# Patient Record
Sex: FEMALE | Race: WHITE
[De-identification: names, ages, dates, MRNs, and addresses within clinical notes are randomized per-mention and may not be internally consistent; named-entity substitution may affect disease eponyms.]

---

## 2018-11-07 ENCOUNTER — HOSPITAL ENCOUNTER (EMERGENCY)
Dept: HOSPITAL 14 - H.ER | Age: 33
LOS: 2 days | Discharge: ANOTHER HOSPITAL | End: 2018-11-09
Payer: MEDICARE

## 2018-11-07 DIAGNOSIS — F31.9: Primary | ICD-10-CM

## 2018-11-07 DIAGNOSIS — G40.909: ICD-10-CM

## 2018-11-07 DIAGNOSIS — Z98.2: ICD-10-CM

## 2018-11-07 DIAGNOSIS — F79: ICD-10-CM

## 2018-11-07 DIAGNOSIS — Z79.899: ICD-10-CM

## 2018-11-07 DIAGNOSIS — F29: ICD-10-CM

## 2018-11-07 DIAGNOSIS — S09.90XA: ICD-10-CM
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
## 2018-11-07 DIAGNOSIS — F84.0: ICD-10-CM

## 2018-11-07 LAB
ALBUMIN SERPL-MCNC: 4.7 G/DL (ref 3.5–5)
ALBUMIN/GLOB SERPL: 1.1 {RATIO} (ref 1–2.1)
ALT SERPL-CCNC: 44 U/L (ref 9–52)
AST SERPL-CCNC: 29 U/L (ref 14–36)
BASOPHILS # BLD AUTO: 0.2 K/UL (ref 0–0.2)
BASOPHILS NFR BLD: 1.3 % (ref 0–2)
BUN SERPL-MCNC: 11 MG/DL (ref 7–17)
CALCIUM SERPL-MCNC: 10 MG/DL (ref 8.4–10.2)
EOSINOPHIL # BLD AUTO: 0.1 K/UL (ref 0–0.7)
EOSINOPHIL NFR BLD: 0.7 % (ref 0–4)
ERYTHROCYTE [DISTWIDTH] IN BLOOD BY AUTOMATED COUNT: 14 % (ref 11.5–14.5)
GFR NON-AFRICAN AMERICAN: > 60
HGB BLD-MCNC: 15 G/DL (ref 12–16)
LYMPHOCYTES # BLD AUTO: 2.8 K/UL (ref 1–4.3)
LYMPHOCYTES NFR BLD AUTO: 19.5 % (ref 20–40)
MCH RBC QN AUTO: 28.4 PG (ref 27–31)
MCHC RBC AUTO-ENTMCNC: 32.7 G/DL (ref 33–37)
MCV RBC AUTO: 86.9 FL (ref 81–99)
MONOCYTES # BLD: 1.1 K/UL (ref 0–0.8)
MONOCYTES NFR BLD: 7.6 % (ref 0–10)
NEUTROPHILS # BLD: 10.3 K/UL (ref 1.8–7)
NEUTROPHILS NFR BLD AUTO: 70.9 % (ref 50–75)
NRBC BLD AUTO-RTO: 0 % (ref 0–0)
PLATELET # BLD: 389 K/UL (ref 130–400)
PMV BLD AUTO: 9.4 FL (ref 7.2–11.7)
RBC # BLD AUTO: 5.29 MIL/UL (ref 3.8–5.2)
WBC # BLD AUTO: 14.5 K/UL (ref 4.8–10.8)

## 2018-11-07 PROCEDURE — 70450 CT HEAD/BRAIN W/O DYE: CPT

## 2018-11-07 PROCEDURE — 81003 URINALYSIS AUTO W/O SCOPE: CPT

## 2018-11-07 PROCEDURE — 81025 URINE PREGNANCY TEST: CPT

## 2018-11-07 PROCEDURE — 80164 ASSAY DIPROPYLACETIC ACD TOT: CPT

## 2018-11-07 PROCEDURE — 80053 COMPREHEN METABOLIC PANEL: CPT

## 2018-11-07 PROCEDURE — 85025 COMPLETE CBC W/AUTO DIFF WBC: CPT

## 2018-11-07 PROCEDURE — 99285 EMERGENCY DEPT VISIT HI MDM: CPT

## 2018-11-07 PROCEDURE — 87086 URINE CULTURE/COLONY COUNT: CPT

## 2018-11-08 LAB
BACTERIA #/AREA URNS HPF: (no result) /[HPF]
BILIRUB UR-MCNC: NEGATIVE MG/DL
COLOR UR: YELLOW
GLUCOSE UR STRIP-MCNC: (no result) MG/DL
LEUKOCYTE ESTERASE UR-ACNC: (no result) LEU/UL
PH UR STRIP: 7 [PH] (ref 5–8)
PROT UR STRIP-MCNC: NEGATIVE MG/DL
RBC # UR STRIP: NEGATIVE /UL
SP GR UR STRIP: 1.01 (ref 1–1.03)
SQUAMOUS EPITHIAL: 4 /HPF (ref 0–5)
URINE CLARITY: (no result)
UROBILINOGEN UR-MCNC: (no result) MG/DL (ref 0.2–1)

## 2018-11-08 NOTE — ED PDOC
- Laboratory Results


Result Diagrams: 


                                 18 20:53





                                 18 20:53





- ECG


O2 Sat by Pulse Oximetry: 98 (RA)


Pulse Ox Interpretation: Normal





Medical Decision Making


Medical Decision Makin:00


--Patient endorsed to this provider by Dr. Son pending [facility] evaluation. She has already been medically cleared for psychiatric 

evaluation. 





09:37 


--Upon reevaluation, patient became agitated, began screaming and cursing at the

staff and tried to get out of bed. Ativan 2 mg IM was given to relieve her 

agitation. 


pt still agitated.





 1130 am pt seen by psychiatrist Dr Saldaña who will order more medication for 

patient. [place] [facility] [place] available yet.





130 pm pt calmer, resting in no distress





1500 pt transfer to Dr Collier [place] [facility]


-----------------------------------------------

--------------------------------------------------


Scribe Attestation:


Documented by Zarina Cardenas acting as a scribe for Yocasta Bello MD





Provider Scribe Attestation:


All medical record entries made by the Scribe were at my direction and 

personally dictated by me. I have reviewed the chart and agree that the record 

accurately reflects my personal performance of the history, physical exam, me

dical decision making, and the department course for this patient. I have also 

personally directed, reviewed, and agree with the discharge instructions and 

disposition.








Disposition





- Clinical Impression


Clinical Impression: 


 Bipolar disorder








- POA


Present On Arrival: None





- Disposition


Disposition: Transfer of Care


Disposition Time: 15:00


Condition: STABLE


Forms:  [facility] (English)


Patient Signed Over To: Kristin Collier


Handoff Comments: pending [facility] [place]

## 2018-11-08 NOTE — CP.PCM.CON
History of Present Illness





- History of Present Illness


History of Present Illness: 


Psychiatry consult note





CC: "I need to get my period back, my parents took it from me."





HPI: 32 yo female w/ h/o intellectual disability and bipolar disorder, presents 

acutely bizarre, labile, disorganized, w/ self-injurious behaviors and ideation.

 Patient historian and expresses paranoid ideation towards her parents.  No 

drugs/etoh.  Patient screened by [facility] and accepted for involuntary psychiatric 

admission.  Patient is currently compliant with her medications: Latuda, 

Depakote and Ativan





Impression: 32 yo female w/ intellectual disability and bipolar disorder, 

presents acutely decompensated, pending transfer to [facility] for involuntary 

psychiatric admission.





-Zyprexa 5 mg PO stat


-Depakote 500 mg PO stat 


-Transfer to [facility] when bed is available





Past Patient History





- Past Social History


Smoking Status: Never Smoked





- CARDIAC


Hx Hypertension: No





- PULMONARY


Hx Tuberculosis: No





- NEUROLOGICAL


Hx Seizures: Yes





- HEMATOLOGICAL/ONCOLOGICAL


Hx Human Immunodeficiency Virus (HIV): No





- GENITOURINARY/GYNECOLOGICAL


Hx Sexually Transmitted Disorders: No





- PSYCHIATRIC


Hx Bipolar Disorder: Yes


Hx Depression: Yes





- SURGICAL HISTORY


Hx Surgeries: No


Other/Comment: nystagmus correction surgery.  V-P shunt.





- ANESTHESIA


Hx Anesthesia: Yes


Hx Anesthesia Reactions: No


Hx Malignant Hyperthermia: No





Meds


Allergies/Adverse Reactions: 


                                    Allergies











Allergy/AdvReac Type Severity Reaction Status Date / Time


 


No Known Allergies Allergy   Verified 07/08/16 21:54














Results





- Vital Signs


Recent Vital Signs: 


                                Last Vital Signs











Temp  98.2 F   11/08/18 02:25


 


Pulse  93 H  11/08/18 02:25


 


Resp  18   11/08/18 02:25


 


BP  111/63   11/08/18 02:25


 


Pulse Ox  98   11/08/18 09:38














- Labs


Result Diagrams: 


                                 11/07/18 20:53





                                 11/07/18 20:53


Labs: 


                         Laboratory Results - last 24 hr











  11/07/18 11/07/18 11/07/18





  20:53 20:53 20:53


 


WBC  14.5 H  


 


RBC  5.29 H  


 


Hgb  15.0  


 


Hct  46.0  


 


MCV  86.9  


 


MCH  28.4  


 


MCHC  32.7 L  


 


RDW  14.0  


 


Plt Count  389  D  


 


MPV  9.4  


 


Neut % (Auto)  70.9  


 


Lymph % (Auto)  19.5 L  


 


Mono % (Auto)  7.6  


 


Eos % (Auto)  0.7  


 


Baso % (Auto)  1.3  


 


Neut # (Auto)  10.3 H  


 


Lymph # (Auto)  2.8  


 


Mono # (Auto)  1.1 H  


 


Eos # (Auto)  0.1  


 


Baso # (Auto)  0.2  


 


Sodium   142 


 


Potassium   4.8 


 


Chloride   105 


 


Carbon Dioxide   26 


 


Anion Gap   16 


 


BUN   11 


 


Creatinine   0.5 L 


 


Est GFR ( Amer)   > 60 


 


Est GFR (Non-Af Amer)   > 60 


 


Random Glucose   92 


 


Calcium   10.0 


 


Total Bilirubin   0.4 


 


AST   29 


 


ALT   44 


 


Alkaline Phosphatase   51 


 


Total Protein   8.8 H 


 


Albumin   4.7 


 


Globulin   4.1 H 


 


Albumin/Globulin Ratio   1.1 


 


Urine Color   


 


Urine Clarity   


 


Urine pH   


 


Ur Specific Gravity   


 


Urine Protein   


 


Urine Glucose (UA)   


 


Urine Ketones   


 


Urine Blood   


 


Urine Nitrate   


 


Urine Bilirubin   


 


Urine Urobilinogen   


 


Ur Leukocyte Esterase   


 


Urine RBC (Auto)   


 


Urine Microscopic WBC   


 


Ur Squamous Epith Cells   


 


Urine Bacteria   


 


Urine Opiates Screen   


 


Urine Methadone Screen   


 


Ur Barbiturates Screen   


 


Valproic Acid    50.3


 


Ur Phencyclidine Scrn   


 


Ur Amphetamines Screen   


 


U Benzodiazepines Scrn   


 


U Oth Cocaine Metabols   


 


U Cannabinoids Screen   


 


Alcohol, Quantitative   














  11/08/18 11/08/18 11/08/18





  03:37 03:37 04:15


 


WBC   


 


RBC   


 


Hgb   


 


Hct   


 


MCV   


 


MCH   


 


MCHC   


 


RDW   


 


Plt Count   


 


MPV   


 


Neut % (Auto)   


 


Lymph % (Auto)   


 


Mono % (Auto)   


 


Eos % (Auto)   


 


Baso % (Auto)   


 


Neut # (Auto)   


 


Lymph # (Auto)   


 


Mono # (Auto)   


 


Eos # (Auto)   


 


Baso # (Auto)   


 


Sodium   


 


Potassium   


 


Chloride   


 


Carbon Dioxide   


 


Anion Gap   


 


BUN   


 


Creatinine   


 


Est GFR ( Amer)   


 


Est GFR (Non-Af Amer)   


 


Random Glucose   


 


Calcium   


 


Total Bilirubin   


 


AST   


 


ALT   


 


Alkaline Phosphatase   


 


Total Protein   


 


Albumin   


 


Globulin   


 


Albumin/Globulin Ratio   


 


Urine Color  Yellow  


 


Urine Clarity  Slighty-cloudy  


 


Urine pH  7.0  


 


Ur Specific Gravity  1.009  


 


Urine Protein  Negative  


 


Urine Glucose (UA)  Neg  


 


Urine Ketones  Negative  


 


Urine Blood  Negative  


 


Urine Nitrate  Negative  


 


Urine Bilirubin  Negative  


 


Urine Urobilinogen  0.2-1.0  


 


Ur Leukocyte Esterase  Neg  


 


Urine RBC (Auto)  < 1  


 


Urine Microscopic WBC  2  


 


Ur Squamous Epith Cells  4  


 


Urine Bacteria  Few H  


 


Urine Opiates Screen   Negative 


 


Urine Methadone Screen   Negative 


 


Ur Barbiturates Screen   Negative 


 


Valproic Acid   


 


Ur Phencyclidine Scrn   Negative 


 


Ur Amphetamines Screen   Negative 


 


U Benzodiazepines Scrn   Negative 


 


U Oth Cocaine Metabols   Negative 


 


U Cannabinoids Screen   Negative 


 


Alcohol, Quantitative    < 10

## 2018-11-08 NOTE — ED PDOC
HPI: Psych/Substance Abuse


Time Seen by Provider: 18 19:11


Chief Complaint (Nursing): Psychiatric Evaluation


Additional Complaint(s): 





32 y/o female with PMH of bipolar disorder, depression, autism, epilepsy (w/  

shunt) who presents to the ED with father for psychiatric evaluation. Per f

ather, pt was agitated this afternoon, yelling and banging her head against a 

door, saying that she wanted to kill herself. Denies LOC. Pt also attempted to 

harm herself with kitchen utensils. Father said she has not had an outburst like

this in 2 years. Pt regularly follows up with [facility], Dr. Salinas. 

She takes Latuda, Depakote, and Ativan daily. Pt had one episode of emesis this 

afternoon after lunch. Denies substance use, HI, hallucinations, fevers, chills,

chest pain, SOB, cough, abdominal pain, nausea, diarrhea, constipation, urinary 

symptoms, back pain, neck pain, headache, vision changes.  





Past Medical History


Reviewed: Historical Data, Nursing Documentation, Vital Signs


Vital Signs: 





                                Last Vital Signs











Temp  99.4 F   18 18:45


 


Pulse  115 H  18 18:45


 


Resp  20   18 18:45


 


BP  131/83   18 18:45


 


Pulse Ox  98   18 18:45














- Medical History


PMH: Bipolar Disorder, Depression, Seizures


   Denies: Diabetes, Hepatitis, HIV, HTN, Sexually Transmitted Disease





- Family History


Family History: States: No Known Family Hx





- Home Medications


Home Medications: 


                                Ambulatory Orders











 Medication  Instructions  Recorded


 


Divalproex [Depakote DR] 1 tab PO DAILY 18


 


Divalproex [Depakote DR] 2 tab PO HS 18


 


LORazepam [Ativan] 1 mg PO BID 18


 


Lurasidone Hydrochloride [Latuda] 1 tab PO DAILY 18














- Allergies


Allergies/Adverse Reactions: 


                                    Allergies











Allergy/AdvReac Type Severity Reaction Status Date / Time


 


No Known Allergies Allergy   Verified 16 21:54














Review of Systems


ROS Statement: Except As Marked, All Systems Reviewed And Found Negative


Constitutional: Negative for: Fever, Chills


Eyes: Negative for: Vision Change


ENT: Negative for: Nose Congestion


Cardiovascular: Negative for: Chest Pain, Palpitations


Respiratory: Negative for: Cough, Shortness of Breath


Gastrointestinal: Positive for: Vomiting.  Negative for: Nausea, Abdominal Pain


Genitourinary Female: Negative for: Dysuria, Frequency, Incontinence, Vaginal 

Discharge, Vaginal Bleeding, Pelvic Pain


Musculoskeletal: Negative for: Neck Pain, Shoulder Pain, Arm Pain, Back Pain, 

Hand Pain, Leg Pain, Foot Pain


Skin: Negative for: Rash


Neurological: Positive for: Seizures, Altered Mental Status.  Negative for: 

Weakness, Numbness, Headache (agitation), Dizziness


Psych: Positive for: Anxiety, Depression, Suicidal ideation





Physical Exam





- Reviewed


Nursing Documentation Reviewed: Yes


Vital Signs Reviewed: Yes





- Physical Exam


Appears: Positive for: Well, Non-toxic, No Acute Distress


Head Exam: Positive for: ATRAUMATIC, NORMAL INSPECTION, NORMOCEPHALIC


Skin: Positive for: Normal Color, Warm, DRY


Eye Exam: Positive for: EOMI, Normal appearance, PERRL


ENT: Positive for: Normal ENT Inspection


Neck: Positive for: Normal, Painless ROM


Cardiovascular/Chest: Positive for: Regular Rate, Rhythm


Respiratory: Positive for: CNT, Normal Breath Sounds


Pulses-Radial (L): 2+


Pulses-Radial (R): 2+


Gastrointestinal/Abdominal: Positive for: Normal Exam, Soft


Back: Positive for: Normal Inspection


Extremity: Positive for: Normal ROM.  Negative for: Tenderness, Deformity, 

Swelling


Neurologic/Psych: Positive for: Alert, CNs II-XII, Oriented, Mood/Affect 

(anxious).  Negative for: Motor/Sensory Deficits





- Laboratory Results


Result Diagrams: 


                                 18 20:53





                                 18 20:53





- ECG


O2 Sat by Pulse Oximetry: 98





Medical Decision Making


Medical Decision Makin:15


Initial Plan:


* Head CT


* CXR, AXR


* Labs


* Valproic acid level


* UA, culture


* UDS


* 1:1


* Crisis Eval





21:00 


Crisis diagnosed pt with Bipolar Disorder, per Dr. Davis. Cannot admit here 

secondary to history of autism, will [place] [facility] 





CBC: leukocytosis, similar to prior visit in 2016. Pending imaging. 


CMP: wnl


Depakote level: wnl





21:30


Pt allowed to take her evening dose of Depakote, per Dr. Son. 





00:00


Will sign out pt to ANGELA Raman who will take over the care of pt. She was 

made aware of all resulted diagnostic studies. [place] [facility] [place], EKG, 

Imaging, urine. Pt stable, resting comfortably in stretcher. 





Disposition





- Clinical Impression


Clinical Impression: 


 Bipolar disorder








- Patient ED Disposition


Is Patient to be Admitted: Transfer of Care





- Disposition


Disposition: Transfer of Care


Disposition Time: 00:00


Condition: STABLE


Forms:  [employer] (English)


Patient Signed Over To: Oriana Vazquez (Pending [facility] [place], EKG, Imaging, 

Urine)

## 2018-11-08 NOTE — ED PDOC
- Laboratory Results


Result Diagrams: 


                                 18 20:53





                                 18 20:53





- ECG


ECG: Positive for: Viewed By Me (reviewed by ED attending)


ECG Rhythm: Positive for: Sinus Rhythm


O2 Sat by Pulse Oximetry: 98





- Progress


ED Course And Treament: 





Case endorsed to writer from James [facility] pending CT, xray, ekg, urine, [facility] [place]

reen





Patient given Zyprexa PO medication due to persistent agitation in ED





2:45


Patient still agitated; intermittently screaming and cursing.  Ativan IM ordered





4:30


Patient sleeping; no distress





CT SCAN OF THE BRAIN WITHOUT IV CONTRAST 


CLINICAL INDICATION: Injury. 


TECHNIQUE: Axial and reformatted sagittal and coronal images of the brain 

obtained without IV contrast administration. 


FINDINGS: 


Absent corpus callosum. 


Unremarkable right ventriculoperitoneal shunt. 


Normal size of the ventricles and extra-axial spaces for the patient's age. 


Normal white matter tracts of the supratentorial brain. 


Normal basal ganglia and thalami. 


Normal brainstem. 


Normal cerebellum. 


There is no demonstrated extra-axial, intraparenchymal, or intraventricular 

hemorrhage. 


There are no findings of an acute ischemic infarction. 


Normal calvarium. There is no demonstrated fracture. 


Normal soft tissue structures. 


Normal visualized paranasal sinuses. 


IMPRESSION: 


Absent corpus callosum. 


Unremarkable right ventriculoperitoneal shunt. 


No CT evidence of acute brain pathology.














<Oriana Vazquez - Last Filed: 18 04:53>





- Laboratory Results


Result Diagrams: 


                                 18 20:53





                                 18 20:53





<Matheus Son - Last Filed: 18 06:47>





Medical Decision Making


Medical Decision Makin


Patient endorsed to Dr. Bello pending [facility] [place].  CXR is wnl.  Patient 

medically cleared.





<Matheus Son - Last Filed: 18 06:47>





Disposition





- POA


Present On Arrival: None





- Disposition


Disposition: Transfer of Care


Disposition Time: 05:00


Patient Signed Over To: Matheus Son


Handoff Comments: pending xray, [facility] [place]





<Oriana Vazquez - Last Filed: 18 04:53>





- POA


Present On Arrival: None





- Disposition


Disposition: Transfer of Care


Disposition Time: 07:00


Patient Signed Over To: Eugenia,Haviva Y


Handoff Comments: pending [facility]





<Matheus Son - Last Filed: 18 06:47>





- Clinical Impression


Clinical Impression: 


 Bipolar disorder








- Disposition


Forms:  [facility] (English)

## 2018-11-08 NOTE — RAD
Date of service: 



11/08/2018



HISTORY:

 clearance 



COMPARISON:

7/9/2016 



FINDINGS:



LUNGS:

No active pulmonary disease.



PLEURA:

No significant pleural effusion identified, no pneumothorax apparent.



CARDIOVASCULAR:

No aortic atherosclerotic calcification present.



Normal cardiac size. No pulmonary vascular congestion.  Right 

ventriculoperitoneal shunt catheter traverses the right hemithorax.



OSSEOUS STRUCTURES:

No significant abnormalities.



VISUALIZED UPPER ABDOMEN:

Normal.



OTHER FINDINGS:

None.



IMPRESSION:

No active disease.

## 2018-11-08 NOTE — CARD
--------------- APPROVED REPORT --------------





Date of service: 11/08/2018



EKG Measurement

Heart [id number]

IN [id number]

[id number]

[id number]

[id number]



<Conclusion>

Normal sinus rhythm

Normal ECG

## 2018-11-08 NOTE — ED PDOC
- Laboratory Results


Result Diagrams: 


                                 11/07/18 20:53





                                 11/07/18 20:53





- ECG


O2 Sat by Pulse Oximetry: 99 (RA)


Pulse Ox Interpretation: Normal





Medical Decision Making


Medical Decision Making: 


15:00 


--Patient is medically and psychiatric cleared for admission to [facility]. Pending 

bed availability. 














------------------------------------------

-------------------------------------------------------


Scribe Attestation:


Documented by Zarina Cardenas acting as a scribe for Kristin Collier MD





Provider Scribe Attestation:


All medical record entries made by the Scribe were at my direction and 

personally dictated by me. I have reviewed the chart and agree that the record 

accurately reflects my personal performance of the history, physical exam, 

medical decision making, and the department course for this patient. I have also

personally directed, reviewed, and agree with the discharge instructions and 

disposition.





1655


--Pt now becoming more agitated, shouting, and pacing around the room.  Will 

given 2mg Ativan. 





Disposition





- Clinical Impression


Clinical Impression: 


 Bipolar disorder








- Disposition


Condition: STABLE


Forms:  [employer] (English)

## 2018-11-08 NOTE — CT
Date of service: 



11/08/2018



PROCEDURE:  CT HEAD WITHOUT CONTRAST.



HISTORY:

head injury



COMPARISON:

None available.



TECHNIQUE:

Axial computed tomography images were obtained through the head/brain 

without intravenous contrast.  



Radiation dose:



Total exam DLP = 876.83 mGy-cm.



This CT exam was performed using one or more of the following dose 

reduction techniques: Automated exposure control, adjustment of the 

mA and/or kV according to patient size, and/or use of iterative 

reconstruction technique.



FINDINGS:



HEMORRHAGE:

No intracranial hemorrhage. 



BRAIN:

Agenesis of the corpus callosum.  No intracranial mass, hemorrhage or 

evidence of acute infarct. 



VENTRICLES:

Two right parietal ventriculostomy catheters.  No ventriculomegaly.  

Focal dilatation of the atrium/occipital horn of the left lateral 

ventricle is noted, of no probable clinical significance.  This 

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 

malformation and absent corpus callosum.  No fracture.  Two right 

parietal ventriculostomy catheters. 



CALVARIUM:

Unremarkable.



PARANASAL SINUSES:

Unremarkable as visualized. No significant inflammatory changes.



MASTOID AIR CELLS:

Unremarkable as visualized. No inflammatory changes.



OTHER FINDINGS:

None.



IMPRESSION:

Absent corpus callosum.  Focal dilatation of the atrium/occipital 

horn left lateral ventricle.  Otherwise unremarkable.  No 

intracranial hemorrhage.



The preliminary findings for this examination were reported by [facility] 

Radiology at 11/8/2018 at 4:52 a.m..  There is concurrence of this 

report with the preliminary findings.

## 2018-11-09 VITALS
SYSTOLIC BLOOD PRESSURE: 103 MMHG | DIASTOLIC BLOOD PRESSURE: 64 MMHG | HEART RATE: 91 BPM | TEMPERATURE: 98.7 F | RESPIRATION RATE: 20 BRPM

## 2018-11-09 VITALS — OXYGEN SATURATION: 97 %

## 2018-11-09 NOTE — CP.PCM.CON
History of Present Illness





- History of Present Illness


History of Present Illness: 


Psychiatry consult follow-up note





CC: Acute agitation/ mood lability/ psychosis





HPI: 34 yo female w/ h/o intellectual disability and bipolar disorder, presents 

acutely bizarre, labile, disorganized, w/ self-injurious behaviors and ideation.

 Patient historian and expresses paranoid ideation towards her parents.  No 

drugs/etoh.  [place] [facility] [place] 

ad[place].  Patient is currently compliant with her medications: Latuda, 

Depakote and Ativan





Impression: 34 yo female w/ intellectual disability and bipolar disorder, 

presents acutely decompensated, pending [place] [facility] [place] 

[place]iatric [place].





-Zyprexa 5 mg PO stat


-Depakote 500 mg PO stat 


-[place] [facility] [place]





Past Patient History





- Past Social History


Smoking Status: Never Smoked





- CARDIAC


Hx Hypertension: No





- PULMONARY


Hx Tuberculosis: No





- NEUROLOGICAL


Hx Seizures: Yes





- HEMATOLOGICAL/ONCOLOGICAL


Hx Human Immunodeficiency Virus (HIV): No





- GENITOURINARY/GYNECOLOGICAL


Hx Sexually Transmitted Disorders: No





- PSYCHIATRIC


Hx Bipolar Disorder: Yes


Hx Depression: Yes





- SURGICAL HISTORY


Hx Surgeries: No


Other/Comment: nystagmus correction surgery.  V-P shunt.





- ANESTHESIA


Hx Anesthesia: Yes


Hx Anesthesia Reactions: No


Hx Malignant Hyperthermia: No





Meds


Allergies/Adverse Reactions: 


                                    Allergies











Allergy/AdvReac Type Severity Reaction Status Date / Time


 


No Known Allergies Allergy   Verified 07/08/16 21:54














- Medications


Medications: 


                               Current Medications





Haloperidol Lactate (Haldol)  5 mg IM STAT STA


   Stop: 11/09/18 11:53











Results





- Vital Signs


Recent Vital Signs: 


                                Last Vital Signs











Temp  98.6 F   11/09/18 10:26


 


Pulse  99 H  11/09/18 10:26


 


Resp  16   11/09/18 06:00


 


BP  111/62   11/09/18 10:26


 


Pulse Ox  98   11/09/18 10:26














- Labs


Result Diagrams: 


                                 11/07/18 20:53





                                 11/07/18 20:53

## 2018-11-09 NOTE — ED PDOC
- Laboratory Results


Result Diagrams: 


                                 11/07/18 20:53





                                 11/07/18 20:53





- ECG


O2 Sat by Pulse Oximetry: 97





Medical Decision Making


Medical Decision Making: 





received patient from Dr. Son. patient is medically cleared as per report, 

accepted to [facility]  but pending bed availability. At present she remains on 1:1 

observation and is without distress or agitation according to her sitter.





patient was seen by psych earlier. Medicated as per psych. Patient is awaiting 

bed





Disposition


Counseled Patient/Family Regarding: Diagnosis





- Clinical Impression


Clinical Impression: 


 Bipolar disorder








- POA


Present On Arrival: None





- Disposition


Disposition: Transfer of Care


Disposition Time: 14:13


Condition: STABLE


Forms:  [employer] (English)


Patient Signed Over To: Ashli Al